# Patient Record
Sex: FEMALE | Race: OTHER
[De-identification: names, ages, dates, MRNs, and addresses within clinical notes are randomized per-mention and may not be internally consistent; named-entity substitution may affect disease eponyms.]

---

## 2018-09-24 ENCOUNTER — HOSPITAL ENCOUNTER (OUTPATIENT)
Dept: HOSPITAL 91 - HKI | Age: 63
Discharge: HOME | End: 2018-09-24
Payer: COMMERCIAL

## 2018-09-24 ENCOUNTER — HOSPITAL ENCOUNTER (OUTPATIENT)
Age: 63
Discharge: HOME | End: 2018-09-24

## 2018-09-24 DIAGNOSIS — M25.551: ICD-10-CM

## 2018-09-24 DIAGNOSIS — M70.61: Primary | ICD-10-CM

## 2019-03-08 ENCOUNTER — HOSPITAL ENCOUNTER (EMERGENCY)
Dept: HOSPITAL 12 - ER | Age: 64
Discharge: LEFT BEFORE DISCHARGE | End: 2019-03-08
Payer: COMMERCIAL

## 2019-03-08 VITALS — BODY MASS INDEX: 32.82 KG/M2 | HEIGHT: 65 IN | WEIGHT: 197 LBS

## 2019-03-08 VITALS — SYSTOLIC BLOOD PRESSURE: 129 MMHG | DIASTOLIC BLOOD PRESSURE: 73 MMHG

## 2019-03-08 DIAGNOSIS — Z88.0: ICD-10-CM

## 2019-03-08 DIAGNOSIS — Z79.899: ICD-10-CM

## 2019-03-08 DIAGNOSIS — R06.00: ICD-10-CM

## 2019-03-08 DIAGNOSIS — Z88.8: ICD-10-CM

## 2019-03-08 DIAGNOSIS — R20.2: ICD-10-CM

## 2019-03-08 DIAGNOSIS — R42: Primary | ICD-10-CM

## 2019-03-08 LAB
BASOPHILS # BLD AUTO: 0.1 K/UL (ref 0–8)
BASOPHILS NFR BLD AUTO: 0.8 % (ref 0–2)
BUN SERPL-MCNC: 21 MG/DL (ref 7–18)
CHLORIDE SERPL-SCNC: 98 MMOL/L (ref 98–107)
CO2 SERPL-SCNC: 25 MMOL/L (ref 21–32)
CREAT SERPL-MCNC: 0.7 MG/DL (ref 0.6–1.3)
EOSINOPHIL # BLD AUTO: 0.2 K/UL (ref 0–0.7)
EOSINOPHIL NFR BLD AUTO: 3.2 % (ref 0–7)
GLUCOSE SERPL-MCNC: 106 MG/DL (ref 74–106)
HCT VFR BLD AUTO: 46.9 % (ref 31.2–41.9)
HGB BLD-MCNC: 15.4 G/DL (ref 10.9–14.3)
LYMPHOCYTES # BLD AUTO: 3.1 K/UL (ref 20–40)
LYMPHOCYTES NFR BLD AUTO: 44.5 % (ref 20.5–51.5)
MCH RBC QN AUTO: 28.7 UUG (ref 24.7–32.8)
MCHC RBC AUTO-ENTMCNC: 33 G/DL (ref 32.3–35.6)
MCV RBC AUTO: 87.4 FL (ref 75.5–95.3)
MONOCYTES # BLD AUTO: 0.4 K/UL (ref 2–10)
MONOCYTES NFR BLD AUTO: 6.4 % (ref 0–11)
NEUTROPHILS # BLD AUTO: 3.1 K/UL (ref 1.8–8.9)
NEUTROPHILS NFR BLD AUTO: 45.1 % (ref 38.5–71.5)
PLATELET # BLD AUTO: 217 K/UL (ref 179–408)
POTASSIUM SERPL-SCNC: 3.2 MMOL/L (ref 3.5–5.1)
RBC # BLD AUTO: 5.37 MIL/UL (ref 3.63–4.92)
WBC # BLD AUTO: 7 K/UL (ref 3.8–11.8)

## 2019-03-08 PROCEDURE — A4663 DIALYSIS BLOOD PRESSURE CUFF: HCPCS

## 2019-03-08 NOTE — NUR
PT WAS EVALUATED BY DR MOMIN. PT DECIDED TO LEAVE HOSPITAL AMA. DR MOMIN 
EXPLAINED ALL RISKS OF LEAVING HOSPITAL AMA TO THE PT AND TO HER FAMILY. PT 
WERBALIZED FULL UNDERSTANDING OF D/C INSTRUCTIONS. PT SIGNED AMA FORM AND LEFT 
HOSPITAL WITH HER FAMILY.

## 2024-12-18 ENCOUNTER — OFFICE (OUTPATIENT)
Dept: URBAN - METROPOLITAN AREA CLINIC 33 | Facility: CLINIC | Age: 69
End: 2024-12-18

## 2024-12-18 VITALS
HEIGHT: 65 IN | SYSTOLIC BLOOD PRESSURE: 128 MMHG | HEART RATE: 81 BPM | DIASTOLIC BLOOD PRESSURE: 85 MMHG | WEIGHT: 200 LBS

## 2024-12-18 DIAGNOSIS — Z86.010 PERSONAL HISTORY COLON POLYPS: ICD-10-CM

## 2024-12-18 DIAGNOSIS — E66.01 OBESITY, MORBID: ICD-10-CM

## 2024-12-18 DIAGNOSIS — Z85.3 PERSONAL HISTORY BREAST CANCER: ICD-10-CM

## 2024-12-18 DIAGNOSIS — K21.9 GERD: ICD-10-CM

## 2024-12-18 PROCEDURE — 99203 OFFICE O/P NEW LOW 30 MIN: CPT | Performed by: SPECIALIST

## 2024-12-18 NOTE — SERVICEHPINOTES
Colonoscopy in 2007, adenomatous polyp removed. last colon 2016 had polyp. 
br
Patient is a female with a history of breast cancer, meningioma, and hypertension, presenting for evaluation of recent onset heartburn and dysgeusia following initiation of Ozempic for weight loss.Patient reports experiencing heartburn and a "bad taste" in her mouth, which she describes as "not the acid, but something's wrong." These symptoms began approximately one year ago after starting Ozempic, and have been intermittent, occurring about once every two weeks. She denies any issues with dysphagia or emesis. She also reports normal bowel movements and denies constipation since starting Ozempic. Patient has lost 17 pounds since starting Ozempic, but notes a recent weight gain of 3 pounds and no further weight loss over the past year, attributing this to increased food intake. She is taking Ozempic solely for weight loss and does not have diabetes.Patient has a history of acid reflux, which was identified during a previous endoscopy. She denies any prior history of heartburn before starting Ozempic. She also has a history of asthma, which she attributes to work-related stress but reports that it has resolved. Patient has undergone two lumpectomies and radiation therapy for breast cancer, one in 2008 and another in 2019. She also had a meningioma removed in 2008. She is currently taking Diovan for hypertension and denies any issues with sleep apnea or snoring. She has no family history of colon cancer and does not smoke.Past Diagnostic Results:br- Colonoscopy (2007): Performed by Dr. Cochran, findings not specified.br- Endoscopy (2007): Performed by Dr. Cochran, findings included acid reflux.Patient was informed that the conversation was recorded for scribing purposes. Patient has given verbal consent.br

## 2024-12-30 ENCOUNTER — AMBULATORY SURGICAL CENTER (OUTPATIENT)
Dept: URBAN - METROPOLITAN AREA SURGERY 24 | Facility: SURGERY | Age: 69
End: 2024-12-30

## 2024-12-30 VITALS — HEIGHT: 65 IN

## 2024-12-30 DIAGNOSIS — K21.9 GASTRO-ESOPHAGEAL REFLUX DISEASE WITHOUT ESOPHAGITIS: ICD-10-CM

## 2024-12-30 DIAGNOSIS — K63.5 POLYP OF COLON: ICD-10-CM

## 2024-12-30 PROCEDURE — 45380 COLONOSCOPY AND BIOPSY: CPT | Mod: 59 | Performed by: SPECIALIST

## 2024-12-30 PROCEDURE — 45390 COLONOSCOPY W/RESECTION: CPT | Performed by: SPECIALIST

## 2024-12-30 PROCEDURE — 43239 EGD BIOPSY SINGLE/MULTIPLE: CPT | Performed by: SPECIALIST

## 2025-01-16 ENCOUNTER — OFFICE (OUTPATIENT)
Dept: URBAN - METROPOLITAN AREA CLINIC 33 | Facility: CLINIC | Age: 70
End: 2025-01-16

## 2025-01-16 VITALS
DIASTOLIC BLOOD PRESSURE: 86 MMHG | TEMPERATURE: 97 F | SYSTOLIC BLOOD PRESSURE: 147 MMHG | HEART RATE: 81 BPM | WEIGHT: 205 LBS | HEIGHT: 65 IN

## 2025-01-16 DIAGNOSIS — K21.9 GERD: ICD-10-CM

## 2025-01-16 DIAGNOSIS — Z86.010 PERSONAL HISTORY COLON POLYPS: ICD-10-CM

## 2025-01-16 DIAGNOSIS — K44.9 DIAPHRAGMATIC HERNIA WITHOUT OBSTRUCTION OR GANGRENE: ICD-10-CM

## 2025-01-16 DIAGNOSIS — K22.89 OTHER SPECIFIED DISEASE OF ESOPHAGUS: ICD-10-CM

## 2025-01-16 DIAGNOSIS — Z85.3 PERSONAL HISTORY BREAST CANCER: ICD-10-CM

## 2025-01-16 DIAGNOSIS — K63.5 POLYP OF COLON: ICD-10-CM

## 2025-01-16 DIAGNOSIS — E66.01 OBESITY, MORBID: ICD-10-CM

## 2025-01-16 PROCEDURE — G2211 COMPLEX E/M VISIT ADD ON: HCPCS | Performed by: SPECIALIST

## 2025-01-16 PROCEDURE — 99213 OFFICE O/P EST LOW 20 MIN: CPT | Performed by: SPECIALIST

## 2025-01-16 NOTE — SERVICEHPINOTES
Patient is a 68-year-old female presenting for follow-up after undergoing an upper endoscopy and colonoscopy on December 30th. Past Diagnostic Results:br- Upper Endoscopy (December 30th): Revealed a small hiatal hernia. Biopsies were taken to check for celiac disease and H. pylori, both of which were negative. No evidence of Arango's esophagus was found.br- Colonoscopy (December 30th): Identified three polyps, two of which were large and one small. All polyps were removed and found to be benign adenomas.Patient was informed that the conversation was recorded for scribing purposes. Patient has given verbal consent.